# Patient Record
Sex: MALE | Race: BLACK OR AFRICAN AMERICAN | URBAN - METROPOLITAN AREA
[De-identification: names, ages, dates, MRNs, and addresses within clinical notes are randomized per-mention and may not be internally consistent; named-entity substitution may affect disease eponyms.]

---

## 2018-08-17 ENCOUNTER — THERAPY VISIT (OUTPATIENT)
Dept: PHYSICAL THERAPY | Facility: CLINIC | Age: 48
End: 2018-08-17
Payer: COMMERCIAL

## 2018-08-17 DIAGNOSIS — M25.562 LEFT KNEE PAIN: Primary | ICD-10-CM

## 2018-08-17 PROCEDURE — 97161 PT EVAL LOW COMPLEX 20 MIN: CPT | Mod: GP | Performed by: PHYSICAL THERAPIST

## 2018-08-17 PROCEDURE — 97110 THERAPEUTIC EXERCISES: CPT | Mod: GP | Performed by: PHYSICAL THERAPIST

## 2018-08-17 ASSESSMENT — ACTIVITIES OF DAILY LIVING (ADL)
HOW_WOULD_YOU_RATE_THE_OVERALL_FUNCTION_OF_YOUR_KNEE_DURING_YOUR_USUAL_DAILY_ACTIVITIES?: SEVERELY ABNORMAL
GO UP STAIRS: ACTIVITY IS VERY DIFFICULT
KNEE_ACTIVITY_OF_DAILY_LIVING_SCORE: 45.71
RAW_SCORE: 32
HOW_WOULD_YOU_RATE_THE_CURRENT_FUNCTION_OF_YOUR_KNEE_DURING_YOUR_USUAL_DAILY_ACTIVITIES_ON_A_SCALE_FROM_0_TO_100_WITH_100_BEING_YOUR_LEVEL_OF_KNEE_FUNCTION_PRIOR_TO_YOUR_INJURY_AND_0_BEING_THE_INABILITY_TO_PERFORM_ANY_OF_YOUR_USUAL_DAILY_ACTIVITIES?: 25
PAIN: THE SYMPTOM AFFECTS MY ACTIVITY SEVERELY
KNEEL ON THE FRONT OF YOUR KNEE: ACTIVITY IS VERY DIFFICULT
WALK: ACTIVITY IS NOT DIFFICULT
RISE FROM A CHAIR: ACTIVITY IS SOMEWHAT DIFFICULT
STAND: ACTIVITY IS FAIRLY DIFFICULT
KNEE_ACTIVITY_OF_DAILY_LIVING_SUM: 32
SWELLING: THE SYMPTOM AFFECTS MY ACTIVITY SLIGHTLY
SIT WITH YOUR KNEE BENT: ACTIVITY IS NOT DIFFICULT
WEAKNESS: THE SYMPTOM AFFECTS MY ACTIVITY SEVERELY
GIVING WAY, BUCKLING OR SHIFTING OF KNEE: THE SYMPTOM AFFECTS MY ACTIVITY SEVERELY
SQUAT: ACTIVITY IS FAIRLY DIFFICULT
GO DOWN STAIRS: ACTIVITY IS FAIRLY DIFFICULT
STIFFNESS: THE SYMPTOM AFFECTS MY ACTIVITY MODERATELY
LIMPING: THE SYMPTOM AFFECTS MY ACTIVITY SLIGHTLY
AS_A_RESULT_OF_YOUR_KNEE_INJURY,_HOW_WOULD_YOU_RATE_YOUR_CURRENT_LEVEL_OF_DAILY_ACTIVITY?: SEVERELY ABNORMAL

## 2018-08-17 NOTE — PROGRESS NOTES
[facility] Initial Evaluation  Subjective:  Patient is a 48 year old male presenting with rehab left knee hpi. No  was used.   Marlon Casey is a 48 year old male with a left knee condition.  Condition occurred with:  Repetition/overuse.    This is a new and recurrent condition  Pt reports he was playing basketball 2 weeks ago and hit his knee with another player. The same day he was going up stairs and his knee buckled and he hit the knee MD visit 8-6-18 received PT referral. Pt reports he has a hx of chondromalacia. His pain is in the front of the knee and around the back of the knee.    Patient reports pain:  Anterior, lateral and posterior.     and is constant and reported as 8/10.  Associated symptoms:  Edema and buckling/giving out. Pain is the same all the time.  Symptoms are exacerbated by ascending stairs, bending/squatting, descending stairs and sitting   Since onset symptoms are unchanged.                                                      Objective:    Gait:    Gait Type:  Antalgic                                                           Knee Evaluation:  ROM:    AROM    Hyperextension:  Left:  0    Right: 0  Extension:  Left: 0    Right:  0  Flexion: Left: 125    Right: 130    Pain: pain with flexion and extension    Strength:     Extension:  Left: 4/5   Pain:        Flexion:  Left: 4/5   Pain:        Quad Set Left: Fair    Pain:     Ligament Testing:            Anterior Drawer:  Left:  Trace      Posterior Drawer: Left:  Neg    Lachman's:  Left:  Trace    Special Tests:   Left knee positive for the following special tests:  Patellar Compression    Palpation:    Left knee tenderness present at:  Medial Joint Line; Lateral Joint Line; Patellar Superior and Patellar Inferior    Edema:  Edema of the knee: Mild inferior     Mobility Testing:  Not Assessed            Functional Testing:  not assessed                  General     ROS    Assessment/Plan:    Patient  is a 48 year old male with left side knee complaints.    Patient has the following significant findings with corresponding treatment plan.                Diagnosis 1:  L knee pain  Pain -  hot/cold therapy and manual therapy  Decreased ROM/flexibility - manual therapy and therapeutic exercise  Decreased joint mobility - manual therapy and therapeutic exercise  Decreased strength - therapeutic exercise and therapeutic activities  Edema - cold therapy  Impaired muscle performance - neuro re-education  Decreased function - therapeutic activities    Therapy Evaluation Codes:   1) History comprised of:   Personal factors that impact the plan of care:      None.    Comorbidity factors that impact the plan of care are:      None.     Medications impacting care: None.  2) Examination of Body Systems comprised of:   Body structures and functions that impact the plan of care:      Knee.   Activity limitations that impact the plan of care are:      Walking.  3) Clinical presentation characteristics are:   Stable/Uncomplicated.  4) Decision-Making    Low complexity using standardized patient assessment instrument and/or measureable assessment of functional outcome.  Cumulative Therapy Evaluation is: Low complexity.    Previous and current functional limitations:  (See Goal Flow Sheet for this information)    Short term and Long term goals: (See Goal Flow Sheet for this information)     Communication ability:  Patient appears to be able to clearly communicate and understand verbal and written communication and follow directions correctly.  Treatment Explanation - The following has been discussed with the patient:   RX ordered/plan of care  Anticipated outcomes  Possible risks and side effects  This patient would benefit from PT intervention to resume normal activities.   Rehab potential is good.    Frequency:  1 X week, once daily  Duration:  for 6 weeks  Discharge Plan:  Achieve all LTG.  Independent in home treatment  program.  Reach maximal therapeutic benefit.    Please refer to the daily flowsheet for treatment today, total treatment time and time spent performing 1:1 timed codes.

## 2018-08-17 NOTE — LETTER
DEPARTMENT OF HEALTH AND HUMAN SERVICES  CENTERS FOR MEDICARE & MEDICAID SERVICES    PLAN/UPDATED PLAN OF PROGRESS FOR OUTPATIENT REHABILITATION    PATIENTS NAME:  Marlon Casey   : 1970  PROVIDER NUMBER:    [id number]  [id number]    PROVIDER NAME: [facility] PHYSICAL THERAP[facility]  MEDICAL RECORD NUMBER: [id number]   START OF CARE DATE:  SOC Date: 18   TYPE:  PT    PRIMARY/TREATMENT DIAGNOSIS: (Pertinent Medical Diagnosis)  Left knee pain    VISITS FROM START OF CARE:  Rxs Used: 1     [place] [facility] Initial Evaluation  Subjective:  Patient is a 48 year old male presenting with rehab left knee hpi. No  was used.   Marlon Casey is a 48 year old male with a left knee condition.  Condition occurred with:  Repetition/overuse.    This is a new and recurrent condition  Pt reports he was playing basketball 2 weeks ago and hit his knee with another player. The same day he was going up stairs and his knee buckled and he hit the knee MD visit 18 received PT referral. Pt reports he has a hx of chondromalacia. His pain is in the front of the knee and around the back of the knee.    Patient reports pain:  Anterior, lateral and posterior.     and is constant and reported as 8/10.  Associated symptoms:  Edema and buckling/giving out. Pain is the same all the time.  Symptoms are exacerbated by ascending stairs, bending/squatting, descending stairs and sitting   Since onset symptoms are unchanged.                        Objective:  Gait:    Gait Type:  Antalgic     Knee Evaluation:  ROM:    AROM  Hyperextension:  Left:  0    Right: 0  Extension:  Left: 0    Right:  0  Flexion: Left: 125    Right: 130  Pain: pain with flexion and extension  Strength:   Extension:  Left: 4/5   Pain:        Flexion:  Left: 4/5   Pain:        Quad Set Left: Fair    Pain:   Ligament Testing:    Anterior Drawer:  Left:  Trace      Posterior Drawer: Left:  Neg     Lachman's:  Left:  Trace    Special Tests:   Left knee positive for the following special tests:  Patellar Compression    Palpation:    Left knee tenderness present at:  Medial Joint Line; Lateral Joint Line; Patellar Superior and Patellar Inferior  Edema:  Edema of the knee: Mild inferior   Mobility Testing:  Not Assessed  Functional Testing:  not assessed      Assessment/Plan:    Patient is a 48 year old male with left side knee complaints.    Patient has the following significant findings with corresponding treatment plan.                Diagnosis 1:  L knee pain  Pain -  hot/cold therapy and manual therapy  Decreased ROM/flexibility - manual therapy and therapeutic exercise  Decreased joint mobility - manual therapy and therapeutic exercise  Decreased strength - therapeutic exercise and therapeutic activities  Edema - cold therapy  Impaired muscle performance - neuro re-education  Decreased function - therapeutic activities    Therapy Evaluation Codes:   1) History comprised of:   Personal factors that impact the plan of care:      None.    Comorbidity factors that impact the plan of care are:      None.     Medications impacting care: None.  2) Examination of Body Systems comprised of:   Body structures and functions that impact the plan of care:      Knee.   Activity limitations that impact the plan of care are:      Walking.  3) Clinical presentation characteristics are:   Stable/Uncomplicated.  4) Decision-Making    Low complexity using standardized patient assessment instrument and/or measureable assessment of functional outcome.  Cumulative Therapy Evaluation is: Low complexity.  Previous and current functional limitations:  (See Goal Flow Sheet for this information)    Short term and Long term goals: (See Goal Flow Sheet for this information)     Communication ability:  Patient appears to be able to clearly communicate and understand verbal and written communication and follow directions  "correctly.  Treatment Explanation - The following has been discussed with the patient:   RX ordered/plan of care  Anticipated outcomes  Possible risks and side effects  This patient would benefit from PT intervention to resume normal activities.   Rehab potential is good.  Frequency:  1 X week, once daily  Duration:  for 6 weeks  Discharge Plan:  Achieve all LTG.  Independent in home treatment program.  Reach maximal therapeutic benefit.      Caregiver Signature/Credentials _____________________________ Date ________       Treating Provider: Conrad PT   I have reviewed and certified the need for these services and plan of treatment while under my care.        PHYSICIAN'S SIGNATURE:   _________________________________________  Date___________   Piero David MD    Certification period:  Beginning of Cert date period: 08/17/18 to  End of Cert period date: 11/14/18     Functional Level Progress Report: Please see attached \"Goal Flow sheet for Functional level.\"    ____X____ Continue Services or       ________ DC Services                Service dates: From  SOC Date: 08/17/18 date to present                         "

## 2018-08-17 NOTE — MR AVS SNAPSHOT
"              After Visit Summary   8/17/2018    Marlon Casey    MRN: [id number]           Patient Information     Date Of Birth          1970        Visit Information        Provider Department      8/17/2018 9:40 AM Marly Roy, PT [place] [facility]        Today's Diagnoses     Left knee pain    -  1       Follow-ups after your visit        Your next 10 appointments already scheduled     Aug 29, 2018 11:30 AM CDT   JOSE Extremity with Marly Roy PT   [facility] Physical [facility] ([place]  )    [address]  [place] [facility] [place] [address] [phone]              Who to contact     If you have questions or need follow up information about today's clinic visit or your schedule please contact [facility] PHYSICAL THERAPY directly at [phone].  Normal or non-critical lab and imaging results will be communicated to you by MyChart, letter or phone within 4 business days after the clinic has received the results. If you do not hear from us within 7 days, please contact the clinic through [employer] or phone. If you have a critical or abnormal lab result, we will notify you by phone as soon as possible.  Submit refill requests through [employer] or call your pharmacy and they will forward the refill request to us. Please allow 3 business days for your refill to be completed.          Additional Information About Your Visit        [employer] Information     [employer] lets you send messages to your doctor, view your test results, renew your prescriptions, schedule appointments and more. To sign up, go to [email or web address] . Click on \"Log in\" on the left side of the screen, which will take you to the Welcome page. Then click on \"Sign up Now\" on the right side of the page.     You will be asked to enter the access code listed below, as well as some personal information. Please follow the directions " to create your username and password.     Your access code is: [email or web address]  Expires: 11/15/2018 10:53 AM     Your access code will  in 90 days. If you need help or a new code, please call your [place] clinic or [phone].        Care EveryWhere ID     This is your Care EveryWhere ID. This could be used by other organizations to access your [place] medical records  [id number]         Blood Pressure from Last 3 Encounters:   No data found for BP    Weight from Last 3 Encounters:   No data found for Wt              We Performed the Following     HC PT EVAL, LOW COMPLEXITY     JOSE INITIAL EVAL REPORT     THERAPEUTIC EXERCISES        Primary Care Provider Office Phone # Fax #    Piero David [phone]       [address] [address]        Equal Access to Services     JUNE [facility] HospitalGILBERTO : Hadii aad ku hadasho Soomaali, waaxda luqadaha, qaybta kaalmada adeegyada, waxcleopatra leger . So [facility] [phone].    ATENCIÓN: Si habla español, tiene a kraus disposición servicios gratuitos de asistencia lingüística. [facility] [phone].    We comply with applicable federal civil rights laws and [place] laws. We do not discriminate on the basis of race, color, national origin, age, disability, sex, sexual orientation, or gender identity.            Thank you!     Thank you for choosing [facility] PHYSICAL THERAP[facility]  for your care. Our goal is always to provide you with excellent care. Hearing back from our patients is one way we can continue to improve our services. Please take a few minutes to complete the written survey that you may receive in the mail after your visit with us. Thank you!             Your Updated Medication List - Protect others around you: Learn how to safely use, store and throw away your medicines at www.disposemymeds.org.      Notice  As of 2018 10:53 AM    You have not been prescribed any medications.

## 2018-08-21 NOTE — PROGRESS NOTES
[place] [facility] Initial Evaluation  Subjective:  Patient is a 48 year old male presenting with rehab left ankle/foot hpi.                                    General health as reported by patient is excellent.  Pertinent medical history includes:  Overweight.    Other surgeries include:  None reported.  Current medications:  None as reported by patient.          Barriers include:  None as reported by patient.    Red flags:  None as reported by patient.           Knee Activity of Daily Living Score: 45.71            Objective:  System    Physical Exam    General     ROS    Assessment/Plan:

## 2018-10-24 PROBLEM — M25.562 LEFT KNEE PAIN: Status: RESOLVED | Noted: 2018-08-17 | Resolved: 2018-10-24
